# Patient Record
Sex: FEMALE | Race: BLACK OR AFRICAN AMERICAN | NOT HISPANIC OR LATINO | Employment: FULL TIME | URBAN - METROPOLITAN AREA
[De-identification: names, ages, dates, MRNs, and addresses within clinical notes are randomized per-mention and may not be internally consistent; named-entity substitution may affect disease eponyms.]

---

## 2023-06-23 ENCOUNTER — OFFICE VISIT (OUTPATIENT)
Dept: PRIMARY CARE | Facility: CLINIC | Age: 23
End: 2023-06-23
Payer: MEDICAID

## 2023-06-23 DIAGNOSIS — Z71.3 WEIGHT LOSS COUNSELING, ENCOUNTER FOR: Primary | ICD-10-CM

## 2023-06-23 DIAGNOSIS — E28.2 PCOS (POLYCYSTIC OVARIAN SYNDROME): ICD-10-CM

## 2023-06-23 PROCEDURE — 1036F TOBACCO NON-USER: CPT

## 2023-06-23 PROCEDURE — 99214 OFFICE O/P EST MOD 30 MIN: CPT

## 2023-06-23 RX ORDER — HYDROXYZINE HYDROCHLORIDE 25 MG/1
TABLET, FILM COATED ORAL
COMMUNITY

## 2023-06-23 RX ORDER — MEDROXYPROGESTERONE ACETATE 10 MG/1
TABLET ORAL
COMMUNITY
Start: 2020-10-26

## 2023-06-23 ASSESSMENT — LIFESTYLE VARIABLES: HOW OFTEN DO YOU HAVE A DRINK CONTAINING ALCOHOL: NEVER

## 2023-06-23 NOTE — PROGRESS NOTES
"Subjective   Patient ID: Blu Mendoza is a 22 y.o. female who presents for New Patient Visit (New pt is here for weight loss).    HPI   She has been on a meal prep system with 2 meals a day with a low carb diet,   She has good water intake,  Works out 3x a week with cardio exercises.  Is concerned about her weight particularly with her history of PCOS      Review of Systems   Constitutional:         Weight loss   All other systems reviewed and are negative.      Objective   Visit Vitals  /90   Pulse 90   Resp 16   Ht 1.6 m (5' 3\")   Wt 80.3 kg (177 lb)   SpO2 98%   BMI 31.35 kg/m²   Smoking Status Never   BSA 1.89 m²        Physical Exam  Constitutional:       Appearance: Normal appearance.   HENT:      Head: Normocephalic and atraumatic.      Right Ear: External ear normal.      Left Ear: External ear normal.      Nose: Nose normal.   Eyes:      Extraocular Movements: Extraocular movements intact.      Conjunctiva/sclera: Conjunctivae normal.   Cardiovascular:      Rate and Rhythm: Normal rate and regular rhythm.      Pulses: Normal pulses.      Heart sounds: Normal heart sounds.   Pulmonary:      Effort: Pulmonary effort is normal.      Breath sounds: Normal breath sounds.   Musculoskeletal:         General: Normal range of motion.      Cervical back: Normal range of motion.   Neurological:      General: No focal deficit present.      Mental Status: She is alert and oriented to person, place, and time.   Psychiatric:         Mood and Affect: Mood normal.         Behavior: Behavior normal.         Thought Content: Thought content normal.         Judgment: Judgment normal.         Assessment/Plan   Diagnoses and all orders for this visit:  Weight loss counseling, encounter for  -     CBC; Future  -     Comprehensive Metabolic Panel; Future  -     TSH with reflex to Free T4 if abnormal; Future  -     Lipid Panel; Future  -     Hemoglobin A1C; Future  PCOS (polycystic ovarian syndrome)  -     CBC; " Future  -     Comprehensive Metabolic Panel; Future  -     TSH with reflex to Free T4 if abnormal; Future  -     Lipid Panel; Future  -     Hemoglobin A1C; Future    Discussed appropriate calorie intake along with muscle strengthening exercises  Discussed her 12 lb weight loss since March and she is not losing an inappropriate amount within 3 months.  Encouraged continued exercising however to increased her meal amount ensuring she has appropriate calorie intake    I have personally reviewed all available pertinent labs, imaging, and consult notes with the patient.     All questions and concerns were addressed. Patient verbalizes understanding instructions and agrees with established plan of care.     Patient seen and discussed with Dr. Cholo Hollis MD

## 2023-06-28 VITALS
DIASTOLIC BLOOD PRESSURE: 90 MMHG | SYSTOLIC BLOOD PRESSURE: 132 MMHG | WEIGHT: 177 LBS | OXYGEN SATURATION: 98 % | BODY MASS INDEX: 31.36 KG/M2 | RESPIRATION RATE: 16 BRPM | HEIGHT: 63 IN | HEART RATE: 90 BPM

## 2023-06-29 ENCOUNTER — TELEPHONE (OUTPATIENT)
Dept: PRIMARY CARE | Facility: CLINIC | Age: 23
End: 2023-06-29
Payer: MEDICAID

## 2024-06-06 PROBLEM — A74.9 CHLAMYDIA: Status: ACTIVE | Noted: 2024-06-06

## 2024-06-06 PROBLEM — K64.4 HEMORRHOIDS, EXTERNAL: Status: ACTIVE | Noted: 2024-06-06

## 2024-06-06 PROBLEM — F33.1 MODERATE EPISODE OF RECURRENT MAJOR DEPRESSIVE DISORDER (MULTI): Chronic | Status: ACTIVE | Noted: 2020-05-14

## 2024-06-06 PROBLEM — H52.7 REFRACTION DISORDER: Status: ACTIVE | Noted: 2024-06-06

## 2024-06-06 PROBLEM — F32.9 MAJOR DEPRESSIVE DISORDER, SINGLE EPISODE WITH ANXIOUS DISTRESS: Status: ACTIVE | Noted: 2023-07-03

## 2024-06-06 PROBLEM — R79.89 ABNORMAL LIVER FUNCTION TESTS: Status: ACTIVE | Noted: 2022-10-25

## 2024-06-06 PROBLEM — E28.2 POLYCYSTIC OVARIAN SYNDROME: Status: ACTIVE | Noted: 2024-06-06

## 2024-06-06 PROBLEM — H52.203 ASTIGMATISM OF BOTH EYES: Status: ACTIVE | Noted: 2024-06-06

## 2024-06-06 PROBLEM — N92.6 IRREGULAR MENSTRUAL CYCLE: Status: ACTIVE | Noted: 2023-05-24

## 2024-06-06 PROBLEM — F41.1 GENERALIZED ANXIETY DISORDER: Status: ACTIVE | Noted: 2020-05-14

## 2024-06-06 PROBLEM — H52.13 MYOPIA OF BOTH EYES: Status: ACTIVE | Noted: 2024-06-06

## 2024-06-06 PROBLEM — M71.331 SYNOVIAL CYST OF WRIST, RIGHT: Status: ACTIVE | Noted: 2024-06-06

## 2024-06-06 PROBLEM — F32.89 OTHER SPECIFIED DEPRESSIVE DISORDER: Status: ACTIVE | Noted: 2023-04-27

## 2024-06-06 PROBLEM — B07.9 WARTS: Status: ACTIVE | Noted: 2024-06-06

## 2024-07-29 ENCOUNTER — APPOINTMENT (OUTPATIENT)
Dept: PRIMARY CARE | Facility: CLINIC | Age: 24
End: 2024-07-29
Payer: MEDICAID

## 2024-08-19 ENCOUNTER — APPOINTMENT (OUTPATIENT)
Dept: PRIMARY CARE | Facility: CLINIC | Age: 24
End: 2024-08-19
Payer: MEDICAID

## 2025-08-13 ENCOUNTER — APPOINTMENT (OUTPATIENT)
Dept: OBSTETRICS AND GYNECOLOGY | Facility: CLINIC | Age: 25
End: 2025-08-13
Payer: COMMERCIAL

## 2025-08-13 VITALS
HEIGHT: 62 IN | DIASTOLIC BLOOD PRESSURE: 92 MMHG | BODY MASS INDEX: 24.66 KG/M2 | SYSTOLIC BLOOD PRESSURE: 138 MMHG | WEIGHT: 134 LBS

## 2025-08-13 DIAGNOSIS — N97.0 ANOVULATION: ICD-10-CM

## 2025-08-13 DIAGNOSIS — E28.2 PCOS (POLYCYSTIC OVARIAN SYNDROME): ICD-10-CM

## 2025-08-13 DIAGNOSIS — N91.2 AMENORRHEA: Primary | ICD-10-CM

## 2025-08-13 PROCEDURE — 3008F BODY MASS INDEX DOCD: CPT

## 2025-08-13 PROCEDURE — 1036F TOBACCO NON-USER: CPT

## 2025-08-13 PROCEDURE — 99213 OFFICE O/P EST LOW 20 MIN: CPT

## 2025-08-13 RX ORDER — MEDROXYPROGESTERONE ACETATE 10 MG/1
10 TABLET ORAL DAILY
Qty: 7 TABLET | Refills: 6 | Status: SHIPPED | OUTPATIENT
Start: 2025-08-13 | End: 2025-08-20

## 2025-08-13 ASSESSMENT — PATIENT HEALTH QUESTIONNAIRE - PHQ9
2. FEELING DOWN, DEPRESSED OR HOPELESS: NOT AT ALL
SUM OF ALL RESPONSES TO PHQ9 QUESTIONS 1 AND 2: 0
1. LITTLE INTEREST OR PLEASURE IN DOING THINGS: NOT AT ALL

## 2025-08-13 ASSESSMENT — COLUMBIA-SUICIDE SEVERITY RATING SCALE - C-SSRS
1. IN THE PAST MONTH, HAVE YOU WISHED YOU WERE DEAD OR WISHED YOU COULD GO TO SLEEP AND NOT WAKE UP?: NO
2. HAVE YOU ACTUALLY HAD ANY THOUGHTS OF KILLING YOURSELF?: NO
6. HAVE YOU EVER DONE ANYTHING, STARTED TO DO ANYTHING, OR PREPARED TO DO ANYTHING TO END YOUR LIFE?: NO

## 2025-08-14 ENCOUNTER — APPOINTMENT (OUTPATIENT)
Dept: PRIMARY CARE | Facility: CLINIC | Age: 25
End: 2025-08-14
Payer: COMMERCIAL

## 2025-08-14 VITALS
SYSTOLIC BLOOD PRESSURE: 118 MMHG | RESPIRATION RATE: 16 BRPM | DIASTOLIC BLOOD PRESSURE: 70 MMHG | TEMPERATURE: 98.4 F | HEIGHT: 62 IN | OXYGEN SATURATION: 97 % | WEIGHT: 133 LBS | HEART RATE: 83 BPM | BODY MASS INDEX: 24.48 KG/M2

## 2025-08-14 DIAGNOSIS — Z11.3 SCREENING EXAMINATION FOR STD (SEXUALLY TRANSMITTED DISEASE): ICD-10-CM

## 2025-08-14 DIAGNOSIS — E55.9 VITAMIN D DEFICIENCY: Primary | ICD-10-CM

## 2025-08-14 DIAGNOSIS — Z13.29 THYROID DISORDER SCREENING: ICD-10-CM

## 2025-08-14 DIAGNOSIS — Z01.89 ROUTINE LAB DRAW: ICD-10-CM

## 2025-08-14 DIAGNOSIS — R53.83 LOW ENERGY: ICD-10-CM

## 2025-08-14 DIAGNOSIS — F12.20 CANNABIS USE DISORDER, MODERATE: ICD-10-CM

## 2025-08-14 PROCEDURE — 3008F BODY MASS INDEX DOCD: CPT

## 2025-08-14 PROCEDURE — 99213 OFFICE O/P EST LOW 20 MIN: CPT

## 2025-08-14 RX ORDER — TRAZODONE HYDROCHLORIDE 50 MG/1
50 TABLET ORAL NIGHTLY PRN
COMMUNITY
Start: 2024-10-08

## 2025-08-14 RX ORDER — ERGOCALCIFEROL 1.25 MG/1
50000 CAPSULE ORAL
Status: CANCELLED | OUTPATIENT
Start: 2025-08-14

## 2025-08-14 RX ORDER — SERTRALINE HYDROCHLORIDE 100 MG/1
100 TABLET, FILM COATED ORAL
COMMUNITY
Start: 2024-10-08

## 2025-08-14 RX ORDER — FAMOTIDINE 20 MG/1
20 TABLET, FILM COATED ORAL 2 TIMES DAILY
COMMUNITY
Start: 2025-07-30 | End: 2025-08-14 | Stop reason: WASHOUT

## 2025-08-14 ASSESSMENT — PATIENT HEALTH QUESTIONNAIRE - PHQ9
4. FEELING TIRED OR HAVING LITTLE ENERGY: MORE THAN HALF THE DAYS
SUM OF ALL RESPONSES TO PHQ QUESTIONS 1-9: 10
2. FEELING DOWN, DEPRESSED OR HOPELESS: MORE THAN HALF THE DAYS
3. TROUBLE FALLING OR STAYING ASLEEP OR SLEEPING TOO MUCH: NOT AT ALL
6. FEELING BAD ABOUT YOURSELF - OR THAT YOU ARE A FAILURE OR HAVE LET YOURSELF OR YOUR FAMILY DOWN: MORE THAN HALF THE DAYS
9. THOUGHTS THAT YOU WOULD BE BETTER OFF DEAD, OR OF HURTING YOURSELF: NOT AT ALL
7. TROUBLE CONCENTRATING ON THINGS, SUCH AS READING THE NEWSPAPER OR WATCHING TELEVISION: MORE THAN HALF THE DAYS
SUM OF ALL RESPONSES TO PHQ9 QUESTIONS 1 AND 2: 4
5. POOR APPETITE OR OVEREATING: NOT AT ALL
8. MOVING OR SPEAKING SO SLOWLY THAT OTHER PEOPLE COULD HAVE NOTICED. OR THE OPPOSITE, BEING SO FIGETY OR RESTLESS THAT YOU HAVE BEEN MOVING AROUND A LOT MORE THAN USUAL: NOT AT ALL
1. LITTLE INTEREST OR PLEASURE IN DOING THINGS: MORE THAN HALF THE DAYS

## 2025-08-14 ASSESSMENT — ANXIETY QUESTIONNAIRES
GAD7 TOTAL SCORE: 11
2. NOT BEING ABLE TO STOP OR CONTROL WORRYING: SEVERAL DAYS
1. FEELING NERVOUS, ANXIOUS, OR ON EDGE: MORE THAN HALF THE DAYS
6. BECOMING EASILY ANNOYED OR IRRITABLE: MORE THAN HALF THE DAYS
7. FEELING AFRAID AS IF SOMETHING AWFUL MIGHT HAPPEN: SEVERAL DAYS
3. WORRYING TOO MUCH ABOUT DIFFERENT THINGS: NEARLY EVERY DAY
4. TROUBLE RELAXING: MORE THAN HALF THE DAYS
5. BEING SO RESTLESS THAT IT IS HARD TO SIT STILL: NOT AT ALL
IF YOU CHECKED OFF ANY PROBLEMS ON THIS QUESTIONNAIRE, HOW DIFFICULT HAVE THESE PROBLEMS MADE IT FOR YOU TO DO YOUR WORK, TAKE CARE OF THINGS AT HOME, OR GET ALONG WITH OTHER PEOPLE: SOMEWHAT DIFFICULT

## 2025-08-15 ENCOUNTER — PATIENT MESSAGE (OUTPATIENT)
Dept: PRIMARY CARE | Facility: CLINIC | Age: 25
End: 2025-08-15
Payer: COMMERCIAL

## 2025-08-15 DIAGNOSIS — E55.9 VITAMIN D INSUFFICIENCY: Primary | ICD-10-CM

## 2025-08-15 LAB
25(OH)D3+25(OH)D2 SERPL-MCNC: 26 NG/ML (ref 30–100)
ALBUMIN SERPL-MCNC: 4.6 G/DL (ref 3.6–5.1)
ALP SERPL-CCNC: 43 U/L (ref 31–125)
ALT SERPL-CCNC: 11 U/L (ref 6–29)
ANION GAP SERPL CALCULATED.4IONS-SCNC: 6 MMOL/L (CALC) (ref 7–17)
AST SERPL-CCNC: 14 U/L (ref 10–30)
BILIRUB SERPL-MCNC: 0.5 MG/DL (ref 0.2–1.2)
BUN SERPL-MCNC: 15 MG/DL (ref 7–25)
CALCIUM SERPL-MCNC: 9.4 MG/DL (ref 8.6–10.2)
CHLORIDE SERPL-SCNC: 105 MMOL/L (ref 98–110)
CO2 SERPL-SCNC: 28 MMOL/L (ref 20–32)
CREAT SERPL-MCNC: 0.98 MG/DL (ref 0.5–0.96)
EGFRCR SERPLBLD CKD-EPI 2021: 82 ML/MIN/1.73M2
ERYTHROCYTE [DISTWIDTH] IN BLOOD BY AUTOMATED COUNT: 13.9 % (ref 11–15)
GLUCOSE SERPL-MCNC: 88 MG/DL (ref 65–99)
HBV SURFACE AG SERPL QL IA: NORMAL
HCT VFR BLD AUTO: 41.6 % (ref 35–45)
HCV AB SERPL QL IA: NORMAL
HGB BLD-MCNC: 13.7 G/DL (ref 11.7–15.5)
HIV 1+2 AB+HIV1 P24 AG SERPL QL IA: NORMAL
HIV 1+2 AB+HIV1 P24 AG SERPL QL IA: NORMAL
MCH RBC QN AUTO: 31.8 PG (ref 27–33)
MCHC RBC AUTO-ENTMCNC: 32.9 G/DL (ref 32–36)
MCV RBC AUTO: 96.5 FL (ref 80–100)
PLATELET # BLD AUTO: 281 THOUSAND/UL (ref 140–400)
PMV BLD REES-ECKER: 11.2 FL (ref 7.5–12.5)
POTASSIUM SERPL-SCNC: 4.3 MMOL/L (ref 3.5–5.3)
PROT SERPL-MCNC: 6.9 G/DL (ref 6.1–8.1)
RBC # BLD AUTO: 4.31 MILLION/UL (ref 3.8–5.1)
SODIUM SERPL-SCNC: 139 MMOL/L (ref 135–146)
T PALLIDUM AB SER QL IA: NORMAL
TSH SERPL-ACNC: 0.76 MIU/L
WBC # BLD AUTO: 7.2 THOUSAND/UL (ref 3.8–10.8)

## 2025-08-19 LAB
25(OH)D3+25(OH)D2 SERPL-MCNC: 26 NG/ML (ref 30–100)
ALBUMIN SERPL-MCNC: 4.6 G/DL (ref 3.6–5.1)
ALP SERPL-CCNC: 43 U/L (ref 31–125)
ALT SERPL-CCNC: 11 U/L (ref 6–29)
ANION GAP SERPL CALCULATED.4IONS-SCNC: 6 MMOL/L (CALC) (ref 7–17)
AST SERPL-CCNC: 14 U/L (ref 10–30)
BILIRUB SERPL-MCNC: 0.5 MG/DL (ref 0.2–1.2)
BUN SERPL-MCNC: 15 MG/DL (ref 7–25)
CALCIUM SERPL-MCNC: 9.4 MG/DL (ref 8.6–10.2)
CHLORIDE SERPL-SCNC: 105 MMOL/L (ref 98–110)
CO2 SERPL-SCNC: 28 MMOL/L (ref 20–32)
CREAT SERPL-MCNC: 0.98 MG/DL (ref 0.5–0.96)
EGFRCR SERPLBLD CKD-EPI 2021: 82 ML/MIN/1.73M2
ERYTHROCYTE [DISTWIDTH] IN BLOOD BY AUTOMATED COUNT: 13.9 % (ref 11–15)
GLUCOSE SERPL-MCNC: 88 MG/DL (ref 65–99)
HBV SURFACE AG SERPL QL IA: NORMAL
HCT VFR BLD AUTO: 41.6 % (ref 35–45)
HCV AB SERPL QL IA: NORMAL
HGB BLD-MCNC: 13.7 G/DL (ref 11.7–15.5)
HIV 1+2 AB+HIV1 P24 AG SERPL QL IA: NORMAL
HIV 1+2 AB+HIV1 P24 AG SERPL QL IA: NORMAL
MCH RBC QN AUTO: 31.8 PG (ref 27–33)
MCHC RBC AUTO-ENTMCNC: 32.9 G/DL (ref 32–36)
MCV RBC AUTO: 96.5 FL (ref 80–100)
PLATELET # BLD AUTO: 281 THOUSAND/UL (ref 140–400)
PMV BLD REES-ECKER: 11.2 FL (ref 7.5–12.5)
POTASSIUM SERPL-SCNC: 4.3 MMOL/L (ref 3.5–5.3)
PROT SERPL-MCNC: 6.9 G/DL (ref 6.1–8.1)
RBC # BLD AUTO: 4.31 MILLION/UL (ref 3.8–5.1)
SODIUM SERPL-SCNC: 139 MMOL/L (ref 135–146)
T PALLIDUM AB SER QL IA: NEGATIVE
TSH SERPL-ACNC: 0.76 MIU/L
WBC # BLD AUTO: 7.2 THOUSAND/UL (ref 3.8–10.8)

## 2025-08-20 RX ORDER — ERGOCALCIFEROL (VITAMIN D2) 10 MCG
400 TABLET ORAL DAILY
Qty: 30 TABLET | Refills: 11 | Status: SHIPPED | OUTPATIENT
Start: 2025-08-20 | End: 2026-08-20

## 2025-08-27 DIAGNOSIS — E28.2 PCOS (POLYCYSTIC OVARIAN SYNDROME): ICD-10-CM

## 2025-08-27 DIAGNOSIS — N93.9 ABNORMAL UTERINE BLEEDING (AUB): Primary | ICD-10-CM

## 2025-08-28 ENCOUNTER — HOSPITAL ENCOUNTER (OUTPATIENT)
Dept: RADIOLOGY | Facility: HOSPITAL | Age: 25
Discharge: HOME | End: 2025-08-28
Payer: MEDICAID

## 2025-08-28 DIAGNOSIS — N93.9 ABNORMAL UTERINE BLEEDING (AUB): ICD-10-CM

## 2025-08-28 DIAGNOSIS — E28.2 PCOS (POLYCYSTIC OVARIAN SYNDROME): ICD-10-CM

## 2025-08-28 PROCEDURE — 76856 US EXAM PELVIC COMPLETE: CPT

## 2025-08-30 ENCOUNTER — HOSPITAL ENCOUNTER (EMERGENCY)
Facility: HOSPITAL | Age: 25
Discharge: HOME | End: 2025-08-30
Attending: STUDENT IN AN ORGANIZED HEALTH CARE EDUCATION/TRAINING PROGRAM
Payer: MEDICAID

## 2025-08-30 ASSESSMENT — LIFESTYLE VARIABLES
TOTAL SCORE: 0
HAVE YOU EVER FELT YOU SHOULD CUT DOWN ON YOUR DRINKING: NO
EVER HAD A DRINK FIRST THING IN THE MORNING TO STEADY YOUR NERVES TO GET RID OF A HANGOVER: NO
HAVE PEOPLE ANNOYED YOU BY CRITICIZING YOUR DRINKING: NO
EVER FELT BAD OR GUILTY ABOUT YOUR DRINKING: NO

## 2025-08-30 ASSESSMENT — PAIN - FUNCTIONAL ASSESSMENT: PAIN_FUNCTIONAL_ASSESSMENT: 0-10

## 2025-08-30 ASSESSMENT — PAIN DESCRIPTION - LOCATION: LOCATION: ABDOMEN

## 2025-08-30 ASSESSMENT — PAIN DESCRIPTION - PAIN TYPE: TYPE: ACUTE PAIN

## 2025-08-30 ASSESSMENT — PAIN SCALES - GENERAL: PAINLEVEL_OUTOF10: 5 - MODERATE PAIN

## 2025-09-03 ENCOUNTER — APPOINTMENT (OUTPATIENT)
Dept: PRIMARY CARE | Facility: CLINIC | Age: 25
End: 2025-09-03
Payer: MEDICAID

## 2025-09-03 PROBLEM — R51.9 HEADACHE: Status: ACTIVE | Noted: 2025-09-03

## 2025-09-03 PROBLEM — S13.9XXA NECK SPRAIN: Status: ACTIVE | Noted: 2025-09-03

## 2025-09-03 PROBLEM — B35.1 ONYCHOMYCOSIS OF TOENAIL: Status: ACTIVE | Noted: 2025-09-03

## 2025-09-03 PROBLEM — L03.039 PARONYCHIA OF TOE: Status: ACTIVE | Noted: 2025-09-03

## 2025-09-03 ASSESSMENT — ANXIETY QUESTIONNAIRES
2. NOT BEING ABLE TO STOP OR CONTROL WORRYING: MORE THAN HALF THE DAYS
5. BEING SO RESTLESS THAT IT IS HARD TO SIT STILL: NOT AT ALL
7. FEELING AFRAID AS IF SOMETHING AWFUL MIGHT HAPPEN: NOT AT ALL
4. TROUBLE RELAXING: MORE THAN HALF THE DAYS
6. BECOMING EASILY ANNOYED OR IRRITABLE: SEVERAL DAYS
GAD7 TOTAL SCORE: 10
1. FEELING NERVOUS, ANXIOUS, OR ON EDGE: MORE THAN HALF THE DAYS
IF YOU CHECKED OFF ANY PROBLEMS ON THIS QUESTIONNAIRE, HOW DIFFICULT HAVE THESE PROBLEMS MADE IT FOR YOU TO DO YOUR WORK, TAKE CARE OF THINGS AT HOME, OR GET ALONG WITH OTHER PEOPLE: SOMEWHAT DIFFICULT
3. WORRYING TOO MUCH ABOUT DIFFERENT THINGS: NEARLY EVERY DAY

## 2025-09-03 ASSESSMENT — PATIENT HEALTH QUESTIONNAIRE - PHQ9
8. MOVING OR SPEAKING SO SLOWLY THAT OTHER PEOPLE COULD HAVE NOTICED. OR THE OPPOSITE, BEING SO FIGETY OR RESTLESS THAT YOU HAVE BEEN MOVING AROUND A LOT MORE THAN USUAL: NOT AT ALL
3. TROUBLE FALLING OR STAYING ASLEEP OR SLEEPING TOO MUCH: SEVERAL DAYS
4. FEELING TIRED OR HAVING LITTLE ENERGY: MORE THAN HALF THE DAYS
2. FEELING DOWN, DEPRESSED OR HOPELESS: SEVERAL DAYS
SUM OF ALL RESPONSES TO PHQ9 QUESTIONS 1 AND 2: 2
6. FEELING BAD ABOUT YOURSELF - OR THAT YOU ARE A FAILURE OR HAVE LET YOURSELF OR YOUR FAMILY DOWN: NEARLY EVERY DAY
7. TROUBLE CONCENTRATING ON THINGS, SUCH AS READING THE NEWSPAPER OR WATCHING TELEVISION: MORE THAN HALF THE DAYS
1. LITTLE INTEREST OR PLEASURE IN DOING THINGS: SEVERAL DAYS
9. THOUGHTS THAT YOU WOULD BE BETTER OFF DEAD, OR OF HURTING YOURSELF: NOT AT ALL
5. POOR APPETITE OR OVEREATING: SEVERAL DAYS
SUM OF ALL RESPONSES TO PHQ QUESTIONS 1-9: 11

## 2025-09-16 ENCOUNTER — APPOINTMENT (OUTPATIENT)
Dept: OBSTETRICS AND GYNECOLOGY | Facility: CLINIC | Age: 25
End: 2025-09-16
Payer: COMMERCIAL